# Patient Record
Sex: MALE | Race: WHITE
[De-identification: names, ages, dates, MRNs, and addresses within clinical notes are randomized per-mention and may not be internally consistent; named-entity substitution may affect disease eponyms.]

---

## 2018-01-23 ENCOUNTER — HOSPITAL ENCOUNTER (EMERGENCY)
Dept: HOSPITAL 92 - ERS | Age: 28
Discharge: HOME | End: 2018-01-23
Payer: SELF-PAY

## 2018-01-23 DIAGNOSIS — F17.210: ICD-10-CM

## 2018-01-23 DIAGNOSIS — J11.1: Primary | ICD-10-CM

## 2018-01-23 LAB
ALBUMIN SERPL BCG-MCNC: 4 G/DL (ref 3.5–5)
ALP SERPL-CCNC: 94 U/L (ref 40–150)
ALT SERPL W P-5'-P-CCNC: 24 U/L (ref 8–55)
ANION GAP SERPL CALC-SCNC: 11 MMOL/L (ref 10–20)
AST SERPL-CCNC: 19 U/L (ref 5–34)
BASOPHILS # BLD AUTO: 0 THOU/UL (ref 0–0.2)
BASOPHILS NFR BLD AUTO: 0.6 % (ref 0–1)
BILIRUB SERPL-MCNC: 0.2 MG/DL (ref 0.2–1.2)
BUN SERPL-MCNC: 7 MG/DL (ref 8.9–20.6)
CALCIUM SERPL-MCNC: 9.1 MG/DL (ref 7.8–10.44)
CHLORIDE SERPL-SCNC: 104 MMOL/L (ref 98–107)
CK MB SERPL-MCNC: 0.6 NG/ML (ref 0–6.6)
CK SERPL-CCNC: 72 U/L (ref 30–200)
CO2 SERPL-SCNC: 26 MMOL/L (ref 22–29)
CREAT CL PREDICTED SERPL C-G-VRATE: 0 ML/MIN (ref 70–130)
DRUG SCREEN CUTOFF: (no result)
EOSINOPHIL # BLD AUTO: 0 THOU/UL (ref 0–0.7)
EOSINOPHIL NFR BLD AUTO: 0.4 % (ref 0–10)
GLOBULIN SER CALC-MCNC: 2.2 G/DL (ref 2.4–3.5)
GLUCOSE SERPL-MCNC: 80 MG/DL (ref 70–105)
HGB BLD-MCNC: 14.4 G/DL (ref 14–18)
LYMPHOCYTES # BLD: 1.6 THOU/UL (ref 1.2–3.4)
LYMPHOCYTES NFR BLD AUTO: 32.3 % (ref 21–51)
MCH RBC QN AUTO: 31.6 PG (ref 27–31)
MCV RBC AUTO: 98.1 FL (ref 80–94)
MEDTOX CONTROL LINE VALID?: (no result)
MEDTOX READER #: (no result)
MONOCYTES # BLD AUTO: 0.6 THOU/UL (ref 0.11–0.59)
MONOCYTES NFR BLD AUTO: 12.9 % (ref 0–10)
NEUTROPHILS # BLD AUTO: 2.7 THOU/UL (ref 1.4–6.5)
NEUTROPHILS NFR BLD AUTO: 53.9 % (ref 42–75)
PLATELET # BLD AUTO: 180 THOU/UL (ref 130–400)
POTASSIUM SERPL-SCNC: 3.9 MMOL/L (ref 3.5–5.1)
RBC # BLD AUTO: 4.58 MILL/UL (ref 4.7–6.1)
SODIUM SERPL-SCNC: 137 MMOL/L (ref 136–145)
TROPONIN I SERPL DL<=0.01 NG/ML-MCNC: (no result) NG/ML (ref ?–0.03)
WBC # BLD AUTO: 5 THOU/UL (ref 4.8–10.8)

## 2018-01-23 PROCEDURE — 36415 COLL VENOUS BLD VENIPUNCTURE: CPT

## 2018-01-23 PROCEDURE — 93005 ELECTROCARDIOGRAM TRACING: CPT

## 2018-01-23 PROCEDURE — 99406 BEHAV CHNG SMOKING 3-10 MIN: CPT

## 2018-01-23 PROCEDURE — 80306 DRUG TEST PRSMV INSTRMNT: CPT

## 2018-01-23 PROCEDURE — 84484 ASSAY OF TROPONIN QUANT: CPT

## 2018-01-23 PROCEDURE — 82553 CREATINE MB FRACTION: CPT

## 2018-01-23 PROCEDURE — 71045 X-RAY EXAM CHEST 1 VIEW: CPT

## 2018-01-23 PROCEDURE — 80053 COMPREHEN METABOLIC PANEL: CPT

## 2018-01-23 PROCEDURE — 96374 THER/PROPH/DIAG INJ IV PUSH: CPT

## 2018-01-23 PROCEDURE — 85379 FIBRIN DEGRADATION QUANT: CPT

## 2018-01-23 PROCEDURE — 85025 COMPLETE CBC W/AUTO DIFF WBC: CPT

## 2018-01-23 NOTE — RAD
PORTABLE CHEST

1/23/18

 

PROVIDED CLINICAL HISTORY:  

Chest pain and dizziness.

 

FINDINGS:  

Comparison to study dated 3/25/08.

 

Cardiac and mediastinal silhouette is within normal limits. 

 

Probable subsegmental atelectatic changes at each lung base. The lungs appear otherwise clear. No ple
ural fluid or pneumothorax evident. 

 

IMPRESSION:  

No definite evidence for an acute cardiopulmonary process. 

 

POS: CET

## 2018-12-26 ENCOUNTER — HOSPITAL ENCOUNTER (EMERGENCY)
Dept: HOSPITAL 92 - ERS | Age: 28
Discharge: HOME | End: 2018-12-26
Payer: SELF-PAY

## 2018-12-26 DIAGNOSIS — F17.210: ICD-10-CM

## 2018-12-26 DIAGNOSIS — L25.5: Primary | ICD-10-CM

## 2018-12-26 PROCEDURE — 96372 THER/PROPH/DIAG INJ SC/IM: CPT

## 2021-12-22 ENCOUNTER — HOSPITAL ENCOUNTER (EMERGENCY)
Dept: HOSPITAL 92 - ERS | Age: 31
Discharge: HOME | End: 2021-12-22
Payer: SELF-PAY

## 2021-12-22 DIAGNOSIS — F17.210: ICD-10-CM

## 2021-12-22 DIAGNOSIS — L03.211: Primary | ICD-10-CM

## 2021-12-22 PROCEDURE — 99283 EMERGENCY DEPT VISIT LOW MDM: CPT
